# Patient Record
Sex: FEMALE | Employment: STUDENT | URBAN - METROPOLITAN AREA
[De-identification: names, ages, dates, MRNs, and addresses within clinical notes are randomized per-mention and may not be internally consistent; named-entity substitution may affect disease eponyms.]

---

## 2023-06-07 ENCOUNTER — OFFICE VISIT (OUTPATIENT)
Dept: ORTHOPEDIC SURGERY | Age: 18
End: 2023-06-07

## 2023-06-07 DIAGNOSIS — M79.605 LEFT LEG PAIN: ICD-10-CM

## 2023-06-07 DIAGNOSIS — M79.662 BILATERAL CALF PAIN: Chronic | ICD-10-CM

## 2023-06-07 DIAGNOSIS — M25.562 LEFT KNEE PAIN, UNSPECIFIED CHRONICITY: Primary | ICD-10-CM

## 2023-06-07 DIAGNOSIS — M79.661 BILATERAL CALF PAIN: Chronic | ICD-10-CM

## 2023-06-07 NOTE — PROGRESS NOTES
Date:  2023    Name:  Esperanza Abdalla  Address:  [address] [place] [address] [address] Abel [address] [address]    :  2005      Age:   21 y.o.    SSN:  (Not on file)      Medical Record Number:  [id number]    Reason for Visit:    Chief Complaint    Knee Pain (New patient left knee. 2nd opinion )      DOS:2023     HPI: Esperanza Abdalla is a 21 y.o. female here today for additional opinion on left calf pain. Her history is significant for a left ACL tear in 2020. She states that 6 days later she underwent ACL reconstruction and meniscal repair at [facility] by Dr. Pinky Burch. She underwent a full rehab protocol including bridge program and states that her bridge program testing strength was excellent. She returned to soccer however continued to have difficulty related to left calf pain associated with any level of of moderate to strenuous activity. She states that this has been her primary concern. She denies any calf pain at rest however when running or even with walking for an extended duration she notes burning tingling and pain in the calf forcing her to stop. When she ceases activity the symptoms resolved quite rapidly. She does also endorse pain over the hardware from her previous surgery however indicates this is mainly with hamstring curls and states that this is not an enormous limitation more an annoyance. ROS: All systems reviewed on patient intake form. Pertinent items are noted in HPI. No past medical history on file. No past surgical history on file. No family history on file. Social History     Socioeconomic History    Marital status: Single       No current outpatient medications on file. No current facility-administered medications for this visit. Not on File    Vital signs: There were no vitals taken for this visit. Left knee exam    Gait: No use of assistive devices. No antalgic gait. Alignment: normal alignment.     Inspection/skin: Skin is intact

## 2023-06-13 ENCOUNTER — TELEPHONE (OUTPATIENT)
Dept: ORTHOPEDIC SURGERY | Age: 18
End: 2023-06-13

## 2023-06-13 NOTE — TELEPHONE ENCOUNTER
Other Attn: Clinic      [email or web address] M#[phone] called. re: Order for Arterial Duplex for LEIGHTON Calf Pain. This procedure is not avail at the Ctr, however the Venus Duplex study is avail. Aminata Schneider is asking for  to rewrite order to advise which procedure he recommends the 21 yr old patient to have. Xfrd call info to [facility]/Practice Mgr to resolve.

## 2023-06-13 NOTE — TELEPHONE ENCOUNTER
SPOKE WITH LISET INFORMED IT IS NOT A VENOUS DOPPLER TO R/O DVT  IT IS AN ARTERIAL DOPPLER   STATED UNDERSTANDING WILL PROCEED WITH OFELIA